# Patient Record
Sex: FEMALE | Race: WHITE | NOT HISPANIC OR LATINO | Employment: UNEMPLOYED
[De-identification: names, ages, dates, MRNs, and addresses within clinical notes are randomized per-mention and may not be internally consistent; named-entity substitution may affect disease eponyms.]

---

## 2017-10-14 ENCOUNTER — HEALTH MAINTENANCE LETTER (OUTPATIENT)
Age: 67
End: 2017-10-14

## 2023-02-17 ENCOUNTER — APPOINTMENT (OUTPATIENT)
Dept: CT IMAGING | Facility: CLINIC | Age: 73
End: 2023-02-17
Attending: EMERGENCY MEDICINE
Payer: COMMERCIAL

## 2023-02-17 ENCOUNTER — HOSPITAL ENCOUNTER (EMERGENCY)
Facility: CLINIC | Age: 73
Discharge: HOME | End: 2023-02-17
Attending: EMERGENCY MEDICINE | Admitting: EMERGENCY MEDICINE
Payer: COMMERCIAL

## 2023-02-17 VITALS
RESPIRATION RATE: 18 BRPM | TEMPERATURE: 98 F | HEART RATE: 96 BPM | DIASTOLIC BLOOD PRESSURE: 106 MMHG | OXYGEN SATURATION: 98 % | SYSTOLIC BLOOD PRESSURE: 211 MMHG
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
## 2023-02-17 DIAGNOSIS — S00.11XA PERIORBITAL ECCHYMOSIS OF RIGHT EYE, INITIAL ENCOUNTER: ICD-10-CM

## 2023-02-17 DIAGNOSIS — S01.81XA FACIAL LACERATION, INITIAL ENCOUNTER: ICD-10-CM

## 2023-02-17 LAB
ALBUMIN SERPL BCG-MCNC: 4.4 G/DL (ref 3.5–5.2)
ALP SERPL-CCNC: 135 U/L (ref 35–104)
ALT SERPL W P-5'-P-CCNC: 23 U/L (ref 10–35)
ANION GAP SERPL CALCULATED.3IONS-SCNC: 14 MMOL/L (ref 7–15)
AST SERPL W P-5'-P-CCNC: 27 U/L (ref 10–35)
BASOPHILS # BLD AUTO: 0 10E3/UL (ref 0–0.2)
BASOPHILS NFR BLD AUTO: 0 %
BILIRUB SERPL-MCNC: 0.6 MG/DL
BUN SERPL-MCNC: 10 MG/DL (ref 8–23)
CALCIUM SERPL-MCNC: 9.4 MG/DL (ref 8.8–10.2)
CHLORIDE SERPL-SCNC: 100 MMOL/L (ref 98–107)
CREAT SERPL-MCNC: 0.74 MG/DL (ref 0.51–0.95)
DEPRECATED HCO3 PLAS-SCNC: 23 MMOL/L (ref 22–29)
EOSINOPHIL # BLD AUTO: 0.1 10E3/UL (ref 0–0.7)
EOSINOPHIL NFR BLD AUTO: 1 %
ERYTHROCYTE [DISTWIDTH] IN BLOOD BY AUTOMATED COUNT: 13.2 % (ref 10–15)
GFR SERPL CREATININE-BSD FRML MDRD: 87 ML/MIN/1.73M2
GLUCOSE SERPL-MCNC: 129 MG/DL (ref 70–99)
HCT VFR BLD AUTO: 41.9 % (ref 35–47)
HGB BLD-MCNC: 13.4 G/DL (ref 11.7–15.7)
IMM GRANULOCYTES # BLD: 0 10E3/UL
IMM GRANULOCYTES NFR BLD: 0 %
LYMPHOCYTES # BLD AUTO: 1.9 10E3/UL (ref 0.8–5.3)
LYMPHOCYTES NFR BLD AUTO: 23 %
MCH RBC QN AUTO: 29.1 PG (ref 26.5–33)
MCHC RBC AUTO-ENTMCNC: 32 G/DL (ref 31.5–36.5)
MCV RBC AUTO: 91 FL (ref 78–100)
MONOCYTES # BLD AUTO: 0.5 10E3/UL (ref 0–1.3)
MONOCYTES NFR BLD AUTO: 6 %
NEUTROPHILS # BLD AUTO: 5.7 10E3/UL (ref 1.6–8.3)
NEUTROPHILS NFR BLD AUTO: 70 %
NRBC # BLD AUTO: 0 10E3/UL
NRBC BLD AUTO-RTO: 0 /100
PLATELET # BLD AUTO: 316 10E3/UL (ref 150–450)
POTASSIUM SERPL-SCNC: 4.1 MMOL/L (ref 3.4–5.3)
PROT SERPL-MCNC: 7.4 G/DL (ref 6.4–8.3)
RBC # BLD AUTO: 4.6 10E6/UL (ref 3.8–5.2)
SODIUM SERPL-SCNC: 137 MMOL/L (ref 136–145)
WBC # BLD AUTO: 8.2 10E3/UL (ref 4–11)

## 2023-02-17 PROCEDURE — 99284 EMERGENCY DEPT VISIT MOD MDM: CPT | Performed by: EMERGENCY MEDICINE

## 2023-02-17 PROCEDURE — 36415 COLL VENOUS BLD VENIPUNCTURE: CPT | Performed by: EMERGENCY MEDICINE

## 2023-02-17 PROCEDURE — 80053 COMPREHEN METABOLIC PANEL: CPT | Performed by: EMERGENCY MEDICINE

## 2023-02-17 PROCEDURE — 70450 CT HEAD/BRAIN W/O DYE: CPT

## 2023-02-17 PROCEDURE — 85025 COMPLETE CBC W/AUTO DIFF WBC: CPT | Performed by: EMERGENCY MEDICINE

## 2023-02-17 PROCEDURE — 99285 EMERGENCY DEPT VISIT HI MDM: CPT | Mod: 25 | Performed by: EMERGENCY MEDICINE

## 2023-02-17 PROCEDURE — 70486 CT MAXILLOFACIAL W/O DYE: CPT

## 2023-02-17 RX ORDER — LIDOCAINE HYDROCHLORIDE AND EPINEPHRINE 10; 10 MG/ML; UG/ML
INJECTION, SOLUTION INFILTRATION; PERINEURAL
Status: DISCONTINUED
Start: 2023-02-17 | End: 2023-02-17 | Stop reason: HOSPADM

## 2023-02-17 RX ORDER — BUPIVACAINE HYDROCHLORIDE AND EPINEPHRINE 2.5; 5 MG/ML; UG/ML
10 INJECTION, SOLUTION EPIDURAL; INFILTRATION; INTRACAUDAL; PERINEURAL ONCE
Status: DISCONTINUED | OUTPATIENT
Start: 2023-02-17 | End: 2023-02-17 | Stop reason: HOSPADM

## 2023-02-17 RX ORDER — ACETAMINOPHEN 500 MG
1000 TABLET ORAL ONCE
Status: DISCONTINUED | OUTPATIENT
Start: 2023-02-17 | End: 2023-02-17 | Stop reason: HOSPADM

## 2023-02-17 ASSESSMENT — ACTIVITIES OF DAILY LIVING (ADL)
ADLS_ACUITY_SCORE: 35
ADLS_ACUITY_SCORE: 35

## 2023-02-17 NOTE — DISCHARGE INSTRUCTIONS
Your facial laceration will heal with time.  Use bacitracin for the next few days.  Keep the wound out of the sun.  You will have a black eye.  Please use ice to the sore areas for 10 minutes every hour while awake  It may help you to sleep up on a couple of pillows to decrease swelling.  Your blood pressure is high today.  Please recheck it in nonmedical settings.  Follow-up with your regular doctor for this.

## 2023-02-17 NOTE — ED PROVIDER NOTES
"    [facility] [place] EMERGENCY DEPARTMENT ([facility])    2/17/23      ED PROVIDER NOTE  ED 2 10:28 AM   History     Chief Complaint   Patient presents with     Head Injury     Face near right eye. Patient hit her head on cement      The history is provided by the patient and medical records.     Ailyn Zaldivar is a 72 year old female with history of asthma, hypertension who presents to the emergency department for evaluation of fall with head injury. Her brother lives across the street; she was in a hurry because she was going to babysit her brothers puppy and he needed to go. She was walking and carrying groceries when she tripped over pavement.  She thinks that she fell because she was walking too fast, was wearing yak traks and feels that it may have caught on the pavement.  Because she was carrying groceries with a bag in each hand she couldn't balance herself or brace herself for the fall, so she fell and struck her right temple against the ground. Broke her glasses, and could hear a sound like \"bone hitting.\" No loss of consciousness with this. She contacted her brother who brought her here for evaluation. Her worst pain is over her right temple, it is throbbing and stinging. She denies other head injury. No neck pain. Been dealing with hip pain for past 3 days, doesn't know if it is arthritis. Hip pain worsens with movement. Patient is on hypertension medications, hasn't taken any yet this morning. She is afraid that she sustained a concussion. Patient not on any anticoagulation but notes that a papercut can make her bleed. Patient has a 25.5 pack year history. Last Tdap last year. She denies loss of consciousness and has no nausea or vomiting.  No paresthesias.    BP Readings from Last 6 Encounters:   02/17/23 (!) 211/106   08/12/14 124/70   03/23/05 190/100     Past Medical History  Past Medical History:   Diagnosis Date     Asthma, mild intermittent, poorly controlled      Personal history of tobacco use, " presenting hazards to health      Past Surgical History:   Procedure Laterality Date     NO HISTORY OF SURGERY       albuterol (PROAIR HFA, PROVENTIL HFA, VENTOLIN HFA) 108 (90 BASE) MCG/ACT inhaler  AMOXICILLIN PO      Allergies   Allergen Reactions     Nka [No Known Allergies]      Family History  Family History   Problem Relation Age of Onset     Cancer Mother         lung cancer     Heart Disease Father         heart disease     Family History Negative Sister      Family History Negative Brother      Alcohol/Drug Brother         Hep C, liver problems     Alcohol/Drug Mother         alcohol     Arthritis Father      Hypertension Mother      Hypertension Father      Neurologic Disorder Mother         stroke     Social History   Social History     Tobacco Use     Smoking status: Every Day     Packs/day: 0.50     Years: 42.00     Pack years: 21.00     Types: Cigarettes     Smokeless tobacco: Never   Substance Use Topics     Alcohol use: No     Drug use: No         A medically appropriate review of systems was performed with pertinent positives and negatives noted in the HPI, and all other systems negative.    Physical Exam   BP: (!) 201/124  Pulse: 111  Temp: 97.4  F (36.3  C)  Resp: 20  SpO2: 99 % BP (!) 211/106   Pulse 96   Temp 98  F (36.7  C) (Oral)   Resp 18   SpO2 98%    Physical Exam  HENT:      Head:         Physical Exam   Constitutional:   well nourished, well developed, appears upset and anxious  HENT:   Head: Normocephalic.  Contusion with laceration to right upper eyelid and right temporal area.   Eyes: Conjunctivae are normal. Pupils are equal, round, and reactive to light.   pharynx has no erythema or exudate, mucous membranes are moist  Neck:   no adenopathy, no bony tenderness  Cardiovascular: regular rate and rhythm without murmurs or gallops  Pulmonary/Chest: Clear to auscultation bilaterally, with no wheezes or retractions. No respiratory distress.  GI: Soft with good bowel sounds.   Non-tender, non-distended, with no guarding, no rebound, no peritoneal signs.   Back:  No bony or CVA tenderness   Musculoskeletal:  no edema  Skin: Skin is warm and dry. No rash noted.   Neurological: alert and oriented to person, place, and time. Nonfocal exam, GCS is 15,KRISHNAMURTHY  Psychiatric:  Anxious  mood and affect.               ED Course, Procedures, & Data      Procedures                     Results for orders placed or performed during the hospital encounter of 02/17/23   Head CT w/o contrast     Status: None    Narrative    CT SCAN OF THE HEAD WITHOUT CONTRAST   2/17/2023 11:36 AM   CT SCAN OF THE FACE WITHOUT CONTRAST 2/17/2023 11:36 AM     HISTORY: Fall, hit right temple area.    TECHNIQUE:  Axial images of the head and facial bones were completed  with sagittal and coronal reformations. Radiation dose for this scan  was reduced using automated exposure control, adjustment of the mA  and/or kV according to patient size, or iterative reconstruction  technique.    COMPARISON: None.    FINDINGS:   Head CT:  There is no evidence of intracranial hemorrhage, mass, acute infarct  or anomaly. The ventricles are normal in size, shape and  configuration. Mild patchy periventricular white matter hypodensities  which are nonspecific, but likely related to chronic microvascular  ischemic disease.     No calvarial fracture appreciated.    Facial CT:  Right lateral periorbital soft tissue swelling. No facial bone  fracture identified. The temporomandibular joints appear properly  located.     The visualized paranasal sinuses are clear.     No mass identified within the visualized soft tissues of the neck.       Impression    IMPRESSION:     1. No evidence of acute intracranial hemorrhage, mass, or herniation.  2. Mild nonspecific white matter changes likely due to chronic  microvascular ischemic disease.   3. Right lateral periorbital soft tissue swelling. No facial bone  fracture.         FAITH SEARS MD         SYSTEM  ID:  [id number]   CT Facial Bones without Contrast     Status: None    Narrative    CT SCAN OF THE HEAD WITHOUT CONTRAST   2/17/2023 11:36 AM   CT SCAN OF THE FACE WITHOUT CONTRAST 2/17/2023 11:36 AM     HISTORY: Fall, hit right temple area.    TECHNIQUE:  Axial images of the head and facial bones were completed  with sagittal and coronal reformations. Radiation dose for this scan  was reduced using automated exposure control, adjustment of the mA  and/or kV according to patient size, or iterative reconstruction  technique.    COMPARISON: None.    FINDINGS:   Head CT:  There is no evidence of intracranial hemorrhage, mass, acute infarct  or anomaly. The ventricles are normal in size, shape and  configuration. Mild patchy periventricular white matter hypodensities  which are nonspecific, but likely related to chronic microvascular  ischemic disease.     No calvarial fracture appreciated.    Facial CT:  Right lateral periorbital soft tissue swelling. No facial bone  fracture identified. The temporomandibular joints appear properly  located.     The visualized paranasal sinuses are clear.     No mass identified within the visualized soft tissues of the neck.       Impression    IMPRESSION:     1. No evidence of acute intracranial hemorrhage, mass, or herniation.  2. Mild nonspecific white matter changes likely due to chronic  microvascular ischemic disease.   3. Right lateral periorbital soft tissue swelling. No facial bone  fracture.         FAITH SEARS MD         SYSTEM ID:  [id number]   Comprehensive metabolic panel     Status: Abnormal   Result Value Ref Range    Sodium 137 136 - 145 mmol/L    Potassium 4.1 3.4 - 5.3 mmol/L    Chloride 100 98 - 107 mmol/L    Carbon Dioxide (CO2) 23 22 - 29 mmol/L    Anion Gap 14 7 - 15 mmol/L    Urea Nitrogen 10.0 8.0 - 23.0 mg/dL    Creatinine 0.74 0.51 - 0.95 mg/dL    Calcium 9.4 8.8 - 10.2 mg/dL    Glucose 129 (H) 70 - 99 mg/dL    Alkaline Phosphatase 135 (H) 35 - 104 U/L    AST  27 10 - 35 U/L    ALT 23 10 - 35 U/L    Protein Total 7.4 6.4 - 8.3 g/dL    Albumin 4.4 3.5 - 5.2 g/dL    Bilirubin Total 0.6 <=1.2 mg/dL    GFR Estimate 87 >60 mL/min/1.73m2   CBC with platelets and differential     Status: None   Result Value Ref Range    WBC Count 8.2 4.0 - 11.0 10e3/uL    RBC Count 4.60 3.80 - 5.20 10e6/uL    Hemoglobin 13.4 11.7 - 15.7 g/dL    Hematocrit 41.9 35.0 - 47.0 %    MCV 91 78 - 100 fL    MCH 29.1 26.5 - 33.0 pg    MCHC 32.0 31.5 - 36.5 g/dL    RDW 13.2 10.0 - 15.0 %    Platelet Count 316 150 - 450 10e3/uL    % Neutrophils 70 %    % Lymphocytes 23 %    % Monocytes 6 %    % Eosinophils 1 %    % Basophils 0 %    % Immature Granulocytes 0 %    NRBCs per 100 WBC 0 <1 /100    Absolute Neutrophils 5.7 1.6 - 8.3 10e3/uL    Absolute Lymphocytes 1.9 0.8 - 5.3 10e3/uL    Absolute Monocytes 0.5 0.0 - 1.3 10e3/uL    Absolute Eosinophils 0.1 0.0 - 0.7 10e3/uL    Absolute Basophils 0.0 0.0 - 0.2 10e3/uL    Absolute Immature Granulocytes 0.0 <=0.4 10e3/uL    Absolute NRBCs 0.0 10e3/uL   CBC with platelets differential     Status: None    Narrative    The following orders were created for panel order CBC with platelets differential.  Procedure                               Abnormality         Status                     ---------                               -----------         ------                     CBC with platelets and d...[[id number]]                      Final result                 Please view results for these tests on the individual orders.     Medications   acetaminophen (TYLENOL) tablet 1,000 mg (has no administration in time range)   bupivacaine 0.25 % - EPINEPHrine 1:200,000 (PF) injection 25 mg (has no administration in time range)   lidocaine 1% with EPINEPHrine 1:100,000 1 %-1:[id number] injection (has no administration in time range)     Labs Ordered and Resulted from Time of ED Arrival to Time of ED Departure   COMPREHENSIVE METABOLIC PANEL - Abnormal       Result Value    Sodium 137       Potassium 4.1      Chloride 100      Carbon Dioxide (CO2) 23      Anion Gap 14      Urea Nitrogen 10.0      Creatinine 0.74      Calcium 9.4      Glucose 129 (*)     Alkaline Phosphatase 135 (*)     AST 27      ALT 23      Protein Total 7.4      Albumin 4.4      Bilirubin Total 0.6      GFR Estimate 87     CBC WITH PLATELETS AND DIFFERENTIAL    WBC Count 8.2      RBC Count 4.60      Hemoglobin 13.4      Hematocrit 41.9      MCV 91      MCH 29.1      MCHC 32.0      RDW 13.2      Platelet Count 316      % Neutrophils 70      % Lymphocytes 23      % Monocytes 6      % Eosinophils 1      % Basophils 0      % Immature Granulocytes 0      NRBCs per 100 WBC 0      Absolute Neutrophils 5.7      Absolute Lymphocytes 1.9      Absolute Monocytes 0.5      Absolute Eosinophils 0.1      Absolute Basophils 0.0      Absolute Immature Granulocytes 0.0      Absolute NRBCs 0.0       CT Facial Bones without Contrast   Final Result   IMPRESSION:      1. No evidence of acute intracranial hemorrhage, mass, or herniation.   2. Mild nonspecific white matter changes likely due to chronic   microvascular ischemic disease.    3. Right lateral periorbital soft tissue swelling. No facial bone   fracture.             FAITH SEARS MD            SYSTEM ID:  [id number]      Head CT w/o contrast   Final Result   IMPRESSION:      1. No evidence of acute intracranial hemorrhage, mass, or herniation.   2. Mild nonspecific white matter changes likely due to chronic   microvascular ischemic disease.    3. Right lateral periorbital soft tissue swelling. No facial bone   fracture.             FAITH SEARS MD            SYSTEM ID:  [id number]             Medical Decision Making  The patient's presentation is strongly suggestive of an acute illness with systemic symptoms.    The patient's evaluation involved:  ordering and/or review of 3+ test(s) in this encounter (see separate area of note for details)    The patient's management involved a decision  regarding minor procedure/surgery with identified risk factors.      Assessment & Plan        I have reviewed the nursing notes.  Emergency Department course:  The patient was seen and examined at 1025 am in room 2.     Tetanus is up-to-date and was given in October 2022.    Laboratory studies show an unremarkable CBC.  Comprehensive metabolic panel is essentially unremarkable apart from an elevated alkaline phosphatase of 135 and mild hyperglycemia with a glucose of 129.    Head CT and CT of facial bones show:                                                                IMPRESSION:     1. No evidence of acute intracranial hemorrhage, mass, or herniation.  2. Mild nonspecific white matter changes likely due to chronic  microvascular ischemic disease.   3. Right lateral periorbital soft tissue swelling. No facial bone  Fracture.    PROCEDURE NOTE: I anesthetized the patient's laceration using lidocaine with epinephrine.  After discussion with the patient and further evaluation the laceration, it appears that there is missing skin.  The flap is not mobile and does not cover the deficit.    She declined suturing of this laceration.  It will heal by secondary intention.  The wound was cleaned and bacitracin and a dressing were placed.  The patient was told that she will have a black eye.  She should sleep up on a couple of pillows and use ice to the sore area as well awake.    Ailyn Zaldivar is a 72 year old female who presents after a fall onto the sidewalk.  She has a laceration of her right temple area/eyelid.  This has been cleaned and dressed.  She was told she would have a black eye.  She was discharged with wound care precautions.  I recommend taking ibuprofen with food or Tylenol as needed for pain.    The patient's blood pressure remains high on discharge.  She was told about this prior to discharge and told about the need for close follow-up with her primary care doctor  I have reviewed the findings,  diagnosis, plan and need for follow up with the patient.    New Prescriptions    No medications on file       Final diagnoses:   Facial laceration, initial encounter   Periorbital ecchymosis of right eye, initial encounter   Fall, initial encounter     This note was created in part by the use of Dragon voice recognition dictation system. Inadvertent grammatical errors and typographical errors may still exist.  MD JUAN DAVID Ramos, Ester Cancino, am serving as a trained medical scribe to document services personally performed by Alessandra Valiente MD based on the provider's statements to me on February 17, 2023.  This document has been checked and approved by the attending provider.    I, Alessandra Valiente MD, was physically present and have reviewed and verified the accuracy of this note documented by Ester Cancino, medical scribe.      Alessandra Valiente MD     [facility] EMERGENCY DEPARTMENT  2/17/2023     Alessandra Valiente MD  02/17/23 [id number]

## 2023-02-17 NOTE — ED TRIAGE NOTES
Triage Assessment     Row Name 02/17/23 1029       Triage Assessment (Adult)    Airway WDL WDL       Respiratory WDL    Respiratory WDL WDL       Skin Circulation/Temperature WDL    Skin Circulation/Temperature WDL X  head lac       Cardiac WDL    Cardiac WDL X;rhythm    Pulse Rate & Regularity tachycardic       Cognitive/Neuro/Behavioral WDL    Cognitive/Neuro/Behavioral WDL WDL

## 2023-02-17 NOTE — ED TRIAGE NOTES
Patient was walking too fast going back to her house and slipped and fell outside her front door. Patient reports a bystander helped her into the house. Her brother drove her here. Swelling and bleeding noted by the right eye

## 2024-07-31 ENCOUNTER — OFFICE VISIT (OUTPATIENT)
Dept: URGENT CARE | Facility: URGENT CARE | Age: 74
End: 2024-07-31
Payer: COMMERCIAL

## 2024-07-31 VITALS
TEMPERATURE: 98.4 F | RESPIRATION RATE: 14 BRPM | DIASTOLIC BLOOD PRESSURE: 87 MMHG | OXYGEN SATURATION: 98 % | HEART RATE: 98 BPM | SYSTOLIC BLOOD PRESSURE: 168 MMHG

## 2024-07-31 DIAGNOSIS — B02.9 HERPES ZOSTER WITHOUT COMPLICATION: Primary | ICD-10-CM

## 2024-07-31 PROCEDURE — 99203 OFFICE O/P NEW LOW 30 MIN: CPT | Performed by: NURSE PRACTITIONER

## 2024-07-31 RX ORDER — LEVOTHYROXINE SODIUM 25 UG/1
25 TABLET ORAL
COMMUNITY
Start: 2023-11-30

## 2024-07-31 RX ORDER — ROSUVASTATIN CALCIUM 10 MG/1
10 TABLET, COATED ORAL DAILY
COMMUNITY
Start: 2024-03-05

## 2024-07-31 RX ORDER — LOSARTAN POTASSIUM 50 MG/1
1 TABLET ORAL DAILY
COMMUNITY
Start: 2024-02-29

## 2024-07-31 RX ORDER — AMLODIPINE BESYLATE 10 MG/1
1 TABLET ORAL DAILY
COMMUNITY
Start: 2023-11-21

## 2024-07-31 RX ORDER — VALACYCLOVIR HYDROCHLORIDE 1 G/1
1000 TABLET, FILM COATED ORAL 3 TIMES DAILY
Qty: 21 TABLET | Refills: 0 | Status: SHIPPED | OUTPATIENT
Start: 2024-07-31 | End: 2024-08-07

## 2024-07-31 NOTE — PROGRESS NOTES
Chief Complaint   Patient presents with    Urgent Care     Patient presents with a rash under her left arm.     Rash     SUBJECTIVE:  Ailyn Zaldivar is a 74 year old female who presents to the clinic today with a rash under her left axillae that she noticed in the last couple days.  Had a prodrome with headache and neuropathic itchy burning sensation.  There are 3 pustular red dots with an off-white center.  No obvious bug bite or exposures.  She has not had the Shingrix vaccine.    Past Medical History:   Diagnosis Date    Asthma, mild intermittent, poorly controlled     Personal history of tobacco use, presenting hazards to health      Current Outpatient Medications   Medication Sig Dispense Refill    albuterol (PROAIR HFA, PROVENTIL HFA, VENTOLIN HFA) 108 (90 BASE) MCG/ACT inhaler Inhale 2 puffs into the lungs every 6 hours as needed for shortness of breath / dyspnea or wheezing 1 Inhaler 3    amLODIPine (NORVASC) 10 MG tablet Take 1 tablet by mouth daily      levothyroxine (SYNTHROID/LEVOTHROID) 25 MCG tablet Take 25 mcg by mouth      losartan (COZAAR) 50 MG tablet Take 1 tablet by mouth daily      rosuvastatin (CRESTOR) 10 MG tablet Take 10 mg by mouth daily      valACYclovir (VALTREX) 1000 mg tablet Take 1 tablet (1,000 mg) by mouth 3 times daily for 7 days 21 tablet 0    AMOXICILLIN PO  (Patient not taking: Reported on 7/31/2024)       No current facility-administered medications for this visit.     Social History     Tobacco Use    Smoking status: Every Day     Current packs/day: 0.50     Average packs/day: 0.5 packs/day for 42.0 years (21.0 ttl pk-yrs)     Types: Cigarettes    Smokeless tobacco: Never   Substance Use Topics    Alcohol use: No     Allergies   Allergen Reactions    Nka [No Known Allergies]        Review of Systems  All systems negative except for those listed above in HPI.    EXAM:   BP (!) 168/87 (BP Location: Right arm)   Pulse 98   Temp 98.4  F (36.9  C) (Temporal)   Resp 14   SpO2 98%      Physical Exam  Vitals reviewed.   Constitutional:       General: She is in acute distress (crying due to past medical situations).      Appearance: Normal appearance.   HENT:      Head: Normocephalic and atraumatic.   Cardiovascular:      Rate and Rhythm: Normal rate.   Pulmonary:      Effort: Pulmonary effort is normal.   Musculoskeletal:         General: Normal range of motion.   Skin:     General: Skin is warm and dry.      Findings: Erythema, lesion and rash present.      Comments: Erythematous lesions to left axillae with off-white center pustules.  These are in a dermatomal distribution and quite sensitive to touch.   Neurological:      General: No focal deficit present.      Mental Status: She is alert and oriented to person, place, and time.   Psychiatric:         Mood and Affect: Mood normal.         Behavior: Behavior normal.       ASSESSMENT:    ICD-10-CM    1. Herpes zoster without complication  B02.9 valACYclovir (VALTREX) 1000 mg tablet        PLAN:    Valacyclovir 1000mg as directed, three times a day for 7 days.  Acyclovir 800mg as directed 5 times daily for 10 days.  This is from a reactivation of the chicken pox virus.  Keep covered to avoid spread especially around pregnant women and infants.  Avoid direct contact with others- can be spread through direct contact with fluid in blisters.  Avoid touching affected area; Wash hands frequently.  May apply calamine as needed for itch relief.  May take up to 4000 mg of Tylenol daily- 1000 mg 4 times daily.  Follow up with primary care provider as needed if symptoms fail to resolve or worsen.      Follow up with primary care provider with any problems, questions or concerns or if symptoms worsen or fail to improve. Patient agreed to plan and verbalized understanding.    EMELY [facility]

## 2024-08-01 NOTE — PATIENT INSTRUCTIONS
Valacyclovir 1000mg as directed, three times a day for 7 days.  Acyclovir 800mg as directed 5 times daily for 10 days.  This is from a reactivation of the chicken pox virus.  Keep covered to avoid spread especially around pregnant women and infants.  Avoid direct contact with others- can be spread through direct contact with fluid in blisters.  Avoid touching affected area; Wash hands frequently.  May apply calamine as needed for itch relief.  May take up to 4000 mg of Tylenol daily- 1000 mg 4 times daily.  Follow up with primary care provider as needed if symptoms fail to resolve or worsen.